# Patient Record
Sex: MALE | Race: WHITE | Employment: FULL TIME | URBAN - NONMETROPOLITAN AREA
[De-identification: names, ages, dates, MRNs, and addresses within clinical notes are randomized per-mention and may not be internally consistent; named-entity substitution may affect disease eponyms.]

---

## 2021-03-29 ENCOUNTER — APPOINTMENT (OUTPATIENT)
Dept: OCCUPATIONAL MEDICINE | Facility: OTHER | Age: 61
End: 2021-03-29

## 2021-03-29 PROCEDURE — 92552 PURE TONE AUDIOMETRY AIR: CPT

## 2021-03-29 PROCEDURE — 99000 SPECIMEN HANDLING OFFICE-LAB: CPT

## 2021-03-29 PROCEDURE — 99499 UNLISTED E&M SERVICE: CPT

## 2021-04-21 ENCOUNTER — HOSPITAL ENCOUNTER (EMERGENCY)
Facility: OTHER | Age: 61
Discharge: HOME | End: 2021-04-21
Attending: EMERGENCY MEDICINE | Admitting: EMERGENCY MEDICINE
Payer: COMMERCIAL

## 2021-04-21 ENCOUNTER — APPOINTMENT (OUTPATIENT)
Dept: GENERAL RADIOLOGY | Facility: OTHER | Age: 61
End: 2021-04-21
Attending: EMERGENCY MEDICINE
Payer: COMMERCIAL

## 2021-04-21 VITALS
SYSTOLIC BLOOD PRESSURE: 154 MMHG | DIASTOLIC BLOOD PRESSURE: 93 MMHG | HEART RATE: 67 BPM | OXYGEN SATURATION: 100 % | TEMPERATURE: 98.5 F | RESPIRATION RATE: 16 BRPM | WEIGHT: 179 LBS | BODY MASS INDEX: 23.72 KG/M2 | HEIGHT: 73 IN

## 2021-04-21 DIAGNOSIS — K59.00 CONSTIPATION, UNSPECIFIED CONSTIPATION TYPE: ICD-10-CM

## 2021-04-21 DIAGNOSIS — R10.84 ABDOMINAL PAIN, GENERALIZED: ICD-10-CM
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
## 2021-04-21 PROBLEM — M54.9 BACK PAIN: Status: ACTIVE | Noted: 2021-04-21

## 2021-04-21 PROBLEM — M54.12 CERVICAL RADICULOPATHY: Status: ACTIVE | Noted: 2019-12-17

## 2021-04-21 PROBLEM — G43.909 MIGRAINE HEADACHE: Status: ACTIVE | Noted: 2021-04-21

## 2021-04-21 PROBLEM — G46.4: Status: ACTIVE | Noted: 2021-04-21

## 2021-04-21 PROBLEM — G89.4 CHRONIC PAIN DISORDER: Status: ACTIVE | Noted: 2020-02-14

## 2021-04-21 PROBLEM — G47.00 INSOMNIA: Status: ACTIVE | Noted: 2020-02-14

## 2021-04-21 PROBLEM — E66.3 OVERWEIGHT: Status: ACTIVE | Noted: 2020-04-09

## 2021-04-21 PROBLEM — E29.1 HYPOGONADISM MALE: Status: ACTIVE | Noted: 2018-08-27

## 2021-04-21 PROBLEM — K58.9 IRRITABLE BOWEL SYNDROME: Status: ACTIVE | Noted: 2021-04-21

## 2021-04-21 PROBLEM — M50.90 CERVICAL DISC DISORDER: Status: ACTIVE | Noted: 2020-02-14

## 2021-04-21 LAB
ALBUMIN SERPL-MCNC: 4 G/DL (ref 3.5–5.7)
ALP SERPL-CCNC: 62 U/L (ref 34–104)
ALT SERPL W P-5'-P-CCNC: 17 U/L (ref 7–52)
ANION GAP SERPL CALCULATED.3IONS-SCNC: 8 MMOL/L (ref 3–14)
AST SERPL W P-5'-P-CCNC: 16 U/L (ref 13–39)
BASOPHILS # BLD AUTO: 0 10E9/L (ref 0–0.2)
BASOPHILS NFR BLD AUTO: 0.8 %
BILIRUB SERPL-MCNC: 1 MG/DL (ref 0.3–1)
BUN SERPL-MCNC: 11 MG/DL (ref 7–25)
CALCIUM SERPL-MCNC: 9.2 MG/DL (ref 8.6–10.3)
CHLORIDE SERPL-SCNC: 98 MMOL/L (ref 98–107)
CO2 SERPL-SCNC: 25 MMOL/L (ref 21–31)
CREAT SERPL-MCNC: 1.07 MG/DL (ref 0.7–1.3)
DIFFERENTIAL METHOD BLD: ABNORMAL
EOSINOPHIL # BLD AUTO: 0.2 10E9/L (ref 0–0.7)
EOSINOPHIL NFR BLD AUTO: 3.8 %
ERYTHROCYTE [DISTWIDTH] IN BLOOD BY AUTOMATED COUNT: 12 % (ref 10–15)
GFR SERPL CREATININE-BSD FRML MDRD: 70 ML/MIN/{1.73_M2}
GLUCOSE SERPL-MCNC: 101 MG/DL (ref 70–105)
HCT VFR BLD AUTO: 40.5 % (ref 40–53)
HGB BLD-MCNC: 14.9 G/DL (ref 13.3–17.7)
IMM GRANULOCYTES # BLD: 0 10E9/L (ref 0–0.4)
IMM GRANULOCYTES NFR BLD: 0.6 %
LYMPHOCYTES # BLD AUTO: 1.4 10E9/L (ref 0.8–5.3)
LYMPHOCYTES NFR BLD AUTO: 27.5 %
MCH RBC QN AUTO: 32.9 PG (ref 26.5–33)
MCHC RBC AUTO-ENTMCNC: 36.8 G/DL (ref 31.5–36.5)
MCV RBC AUTO: 89 FL (ref 78–100)
MONOCYTES # BLD AUTO: 0.5 10E9/L (ref 0–1.3)
MONOCYTES NFR BLD AUTO: 9.8 %
NEUTROPHILS # BLD AUTO: 2.9 10E9/L (ref 1.6–8.3)
NEUTROPHILS NFR BLD AUTO: 57.5 %
PLATELET # BLD AUTO: 200 10E9/L (ref 150–450)
POTASSIUM SERPL-SCNC: 4 MMOL/L (ref 3.5–5.1)
PROT SERPL-MCNC: 6.1 G/DL (ref 6.4–8.9)
RBC # BLD AUTO: 4.53 10E12/L (ref 4.4–5.9)
SODIUM SERPL-SCNC: 131 MMOL/L (ref 134–144)
WBC # BLD AUTO: 5 10E9/L (ref 4–11)

## 2021-04-21 PROCEDURE — 250N000013 HC RX MED GY IP 250 OP 250 PS 637: Performed by: EMERGENCY MEDICINE

## 2021-04-21 PROCEDURE — 96361 HYDRATE IV INFUSION ADD-ON: CPT | Performed by: EMERGENCY MEDICINE

## 2021-04-21 PROCEDURE — 93010 ELECTROCARDIOGRAM REPORT: CPT | Performed by: INTERNAL MEDICINE

## 2021-04-21 PROCEDURE — 85025 COMPLETE CBC W/AUTO DIFF WBC: CPT | Performed by: EMERGENCY MEDICINE

## 2021-04-21 PROCEDURE — 93005 ELECTROCARDIOGRAM TRACING: CPT | Performed by: EMERGENCY MEDICINE

## 2021-04-21 PROCEDURE — 99285 EMERGENCY DEPT VISIT HI MDM: CPT | Mod: 25 | Performed by: EMERGENCY MEDICINE

## 2021-04-21 PROCEDURE — 250N000011 HC RX IP 250 OP 636: Performed by: EMERGENCY MEDICINE

## 2021-04-21 PROCEDURE — 74018 RADEX ABDOMEN 1 VIEW: CPT

## 2021-04-21 PROCEDURE — 96374 THER/PROPH/DIAG INJ IV PUSH: CPT | Performed by: EMERGENCY MEDICINE

## 2021-04-21 PROCEDURE — 99282 EMERGENCY DEPT VISIT SF MDM: CPT | Performed by: EMERGENCY MEDICINE

## 2021-04-21 PROCEDURE — 80053 COMPREHEN METABOLIC PANEL: CPT | Performed by: EMERGENCY MEDICINE

## 2021-04-21 PROCEDURE — 258N000003 HC RX IP 258 OP 636: Performed by: EMERGENCY MEDICINE

## 2021-04-21 RX ORDER — OXYBUTYNIN CHLORIDE 5 MG/1
10 TABLET ORAL DAILY
COMMUNITY

## 2021-04-21 RX ORDER — NIFEDIPINE 30 MG/1
30 TABLET, EXTENDED RELEASE ORAL DAILY
COMMUNITY

## 2021-04-21 RX ORDER — METOPROLOL TARTRATE 25 MG/1
50 TABLET, FILM COATED ORAL DAILY
COMMUNITY

## 2021-04-21 RX ORDER — HYDROCODONE BITARTRATE AND ACETAMINOPHEN 10; 325 MG/1; MG/1
1 TABLET ORAL PRN
COMMUNITY

## 2021-04-21 RX ORDER — MAGNESIUM CARB/ALUMINUM HYDROX 105-160MG
296 TABLET,CHEWABLE ORAL ONCE
Status: COMPLETED | OUTPATIENT
Start: 2021-04-21 | End: 2021-04-21

## 2021-04-21 RX ORDER — ONDANSETRON 2 MG/ML
4 INJECTION INTRAMUSCULAR; INTRAVENOUS ONCE
Status: COMPLETED | OUTPATIENT
Start: 2021-04-21 | End: 2021-04-21

## 2021-04-21 RX ADMIN — SODIUM CHLORIDE 1000 ML: 9 INJECTION, SOLUTION INTRAVENOUS at 09:47

## 2021-04-21 RX ADMIN — ONDANSETRON 4 MG: 2 INJECTION INTRAMUSCULAR; INTRAVENOUS at 09:33

## 2021-04-21 RX ADMIN — MAGNESIUM CITRATE 296 ML: 1.75 LIQUID ORAL at 11:06

## 2021-04-21 ASSESSMENT — ENCOUNTER SYMPTOMS
LIGHT-HEADEDNESS: 0
DYSURIA: 0
SHORTNESS OF BREATH: 0
AGITATION: 0
FEVER: 0
ARTHRALGIAS: 0
CHEST TIGHTNESS: 0
CHILLS: 0
CONSTIPATION: 1
ABDOMINAL PAIN: 1
NAUSEA: 1
VOMITING: 1

## 2021-04-21 ASSESSMENT — MIFFLIN-ST. JEOR: SCORE: 1665.82

## 2021-04-21 NOTE — ED PROVIDER NOTES
History     Chief Complaint   Patient presents with     Abdominal Pain     Nausea     HPI  Benji Vo is a 60 year old male who is here with abdominal pain nausea vomiting and constipation.  Patient is up here for work.  He is living in a house that his company is renting.  He says that the water there is horrible.  He says that his symptoms began just within a day or 2 of moving in there and drinking this water.  He has not been drinking it for the last week but his symptoms have not improved.  He complains of abdominal pain and a sense of fullness.  He has been vomiting but not every day.  He does have chronic problems with constipation and it might be a little bit worse right now.  He says that he feels like there is a big ball in his stomach.  Also occasionally gets chest pain which he attributes to heartburn.  He has not noticed anything that specifically bring it on.  Usually associated with his nausea.  No fevers or chills.  No change in urination.  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     Priority: Medium     Snake bite 04/21/2021     Priority: Medium     Overweight 04/09/2020     Priority: Medium     Cervical disc disorder 02/14/2020     Priority: Medium     Chronic pain disorder 02/14/2020     Priority: Medium     Insomnia 02/14/2020     Priority: Medium     Cervical radiculopathy 12/17/2019     Priority: Medium     Hypogonadism male 08/27/2018     Priority: Medium     Encounter for long-term (current) use of insulin (H) 11/14/2016      "Priority: Medium     Psoriasis with arthropathy (H) 10/31/2016     Priority: Medium     Arthritis 08/05/2016     Priority: Medium     Carpal tunnel syndrome 06/17/2016     Priority: Medium     Supraventricular tachycardia (H) 04/22/2016     Priority: Medium        Past Medical History:    No past medical history on file.    Past Surgical History:    No past surgical history on file.    Family History:    No family history on file.    Social History:  Marital Status:    Social History     Tobacco Use     Smoking status: Not on file   Substance Use Topics     Alcohol use: Not on file     Drug use: Not on file        Medications:    ASPIRIN 81 PO  HYDROcodone-acetaminophen (NORCO)  MG per tablet  metoprolol tartrate (LOPRESSOR) 25 MG tablet  NIFEdipine ER OSMOTIC (PROCARDIA XL) 30 MG 24 hr tablet  oxybutynin (DITROPAN) 5 MG tablet  ustekinumab (STELARA) 45 MG/0.5ML SOLN          Review of Systems   Constitutional: Negative for chills and fever.   HENT: Negative for congestion.    Eyes: Negative for visual disturbance.   Respiratory: Negative for chest tightness and shortness of breath.    Cardiovascular: Negative for chest pain.   Gastrointestinal: Positive for abdominal pain, constipation, nausea and vomiting.   Genitourinary: Negative for dysuria.   Musculoskeletal: Negative for arthralgias.   Skin: Negative for rash.   Neurological: Negative for light-headedness.   Psychiatric/Behavioral: Negative for agitation.       Physical Exam   BP: 130/89  Pulse: 82  Temp: 97.9  F (36.6  C)  Resp: 16  Height: 185.4 cm (6' 1\")  Weight: 81.2 kg (179 lb)  SpO2: 100 %      Physical Exam  Vitals signs and nursing note reviewed.   Constitutional:       Appearance: He is well-developed.   HENT:      Head: Normocephalic and atraumatic.      Mouth/Throat:      Mouth: Mucous membranes are moist.   Eyes:      Conjunctiva/sclera: Conjunctivae normal.   Cardiovascular:      Rate and Rhythm: Normal rate and regular rhythm.      Heart " sounds: Normal heart sounds.   Pulmonary:      Effort: Pulmonary effort is normal.      Breath sounds: Normal breath sounds.   Abdominal:      General: Abdomen is flat. Bowel sounds are normal. There is no distension.      Palpations: Abdomen is soft.      Tenderness: There is no abdominal tenderness.   Skin:     General: Skin is warm and dry.   Neurological:      Mental Status: He is alert and oriented to person, place, and time.   Psychiatric:         Behavior: Behavior normal.         ED Course        Procedures                 Results for orders placed or performed during the hospital encounter of 04/21/21 (from the past 24 hour(s))   CBC with platelets differential   Result Value Ref Range    WBC 5.0 4.0 - 11.0 10e9/L    RBC Count 4.53 4.4 - 5.9 10e12/L    Hemoglobin 14.9 13.3 - 17.7 g/dL    Hematocrit 40.5 40.0 - 53.0 %    MCV 89 78 - 100 fl    MCH 32.9 26.5 - 33.0 pg    MCHC 36.8 (H) 31.5 - 36.5 g/dL    RDW 12.0 10.0 - 15.0 %    Platelet Count 200 150 - 450 10e9/L    Diff Method Automated Method     % Neutrophils 57.5 %    % Lymphocytes 27.5 %    % Monocytes 9.8 %    % Eosinophils 3.8 %    % Basophils 0.8 %    % Immature Granulocytes 0.6 %    Absolute Neutrophil 2.9 1.6 - 8.3 10e9/L    Absolute Lymphocytes 1.4 0.8 - 5.3 10e9/L    Absolute Monocytes 0.5 0.0 - 1.3 10e9/L    Absolute Eosinophils 0.2 0.0 - 0.7 10e9/L    Absolute Basophils 0.0 0.0 - 0.2 10e9/L    Abs Immature Granulocytes 0.0 0 - 0.4 10e9/L   Comprehensive metabolic panel   Result Value Ref Range    Sodium 131 (L) 134 - 144 mmol/L    Potassium 4.0 3.5 - 5.1 mmol/L    Chloride 98 98 - 107 mmol/L    Carbon Dioxide 25 21 - 31 mmol/L    Anion Gap 8 3 - 14 mmol/L    Glucose 101 70 - 105 mg/dL    Urea Nitrogen 11 7 - 25 mg/dL    Creatinine 1.07 0.70 - 1.30 mg/dL    GFR Estimate 70 >60 mL/min/[1.73_m2]    GFR Estimate If Black 85 >60 mL/min/[1.73_m2]    Calcium 9.2 8.6 - 10.3 mg/dL    Bilirubin Total 1.0 0.3 - 1.0 mg/dL    Albumin 4.0 3.5 - 5.7 g/dL     Protein Total 6.1 (L) 6.4 - 8.9 g/dL    Alkaline Phosphatase 62 34 - 104 U/L    ALT 17 7 - 52 U/L    AST 16 13 - 39 U/L   XR Abdomen 1 View    Narrative    XR ABDOMEN 1 VIEW    HISTORY: 60 years Male constipation    COMPARISON: None    TECHNIQUE: A single view abdomen was performed.   FINDINGS: There is a moderate to large volume of stool the colon. No  abnormally distended loops of bowel are otherwise seen        Impression    IMPRESSION: Moderate to large volume of stool. No evidence of bowel  obstruction.    ELISA LÓPEZ MD       Medications   magnesium citrate solution 296 mL (has no administration in time range)   ondansetron (ZOFRAN) injection 4 mg (4 mg Intravenous Given 4/21/21 7219)   0.9% sodium chloride BOLUS (1,000 mLs Intravenous New Bag 4/21/21 0894)       Assessments & Plan (with Medical Decision Making)     I have reviewed the nursing notes.    I have reviewed the findings, diagnosis, plan and need for follow up with the patient.  Labs are reassuring. Abdominal x-ray does show large volume of stool as above. I believe that is his main problem here. We discussed options and he would like to try a bottle of magnesium citrate rather than an enema. He will be discharged home at this time, once he gets himself clear.I recommend starting up his senna on a daily basis. He can always return here if worse.    New Prescriptions    No medications on file       Final diagnoses:   Abdominal pain, generalized   Constipation, unspecified constipation type       4/21/2021   [facility]     Sravan Gold MD  04/21/21 8225

## 2021-04-21 NOTE — DISCHARGE INSTRUCTIONS
You should try magnesium citrate. You could take a second bottle that after a few hours if this does not work, but I would not do more than 2 bottles. Once you get cleared out, you can start taking your senna again on a daily basis. Return if worse.  
yes

## 2021-04-21 NOTE — ED NOTES
"Nausea is unchanged since zofran was administered.  Patient continues to rate his \"discomfort\" at 7 in his ABD.  Yolande Au RN on 4/21/2021 at 10:47 AM    "

## 2021-04-21 NOTE — ED TRIAGE NOTES
"ED Nursing Triage Note (General)   ________________________________    Benji Issa Vo is a 60 year old Male that presents to triage private car  With history of  ABD pain with N/V reported by patient   Patient is here for work.  He is staying at the [employer] guest house.  He feels that he is having a reaction to the water.  IS c/o ABD pain, n/v.  Pain started a few days after March 29th and is gradually becoming worse. He states that at times, he vomits up foam.     /89   Pulse 82   Temp 97.9  F (36.6  C) (Tympanic)   Resp 16   Ht 1.854 m (6' 1\")   Wt 81.2 kg (179 lb)   SpO2 100%   BMI 23.62 kg/m  t  Patient appears alert  and oriented, in mild distress., and cooperative and calm behavior.    GCS Eye Opening = 4=Spontaneous  Airway: intact  Breathing noted as Normal.  Circulation Normal  Skin normal  Action taken:  Triage to critical care immediately      PRE HOSPITAL PRIOR LIVING SITUATION Alone  "

## 2021-04-22 LAB — INTERPRETATION ECG - MUSE: NORMAL

## 2021-05-27 ENCOUNTER — OFFICE VISIT (OUTPATIENT)
Dept: FAMILY MEDICINE | Facility: OTHER | Age: 61
End: 2021-05-27
Attending: NURSE PRACTITIONER
Payer: COMMERCIAL

## 2021-05-27 VITALS
HEIGHT: 73 IN | BODY MASS INDEX: 23.72 KG/M2 | RESPIRATION RATE: 16 BRPM | SYSTOLIC BLOOD PRESSURE: 132 MMHG | DIASTOLIC BLOOD PRESSURE: 76 MMHG | TEMPERATURE: 98.2 F | HEART RATE: 75 BPM | WEIGHT: 179 LBS | OXYGEN SATURATION: 97 %

## 2021-05-27 DIAGNOSIS — J34.89 SINUS PRESSURE: Primary | ICD-10-CM

## 2021-05-27 PROCEDURE — 99212 OFFICE O/P EST SF 10 MIN: CPT | Performed by: NURSE PRACTITIONER

## 2021-05-27 RX ORDER — SULFAMETHOXAZOLE/TRIMETHOPRIM 800-160 MG
TABLET ORAL
COMMUNITY
Start: 2021-05-20

## 2021-05-27 ASSESSMENT — PAIN SCALES - GENERAL: PAINLEVEL: MILD PAIN (3)

## 2021-05-27 ASSESSMENT — MIFFLIN-ST. JEOR: SCORE: 1665.82

## 2021-05-27 NOTE — NURSING NOTE
"Chief Complaint   Patient presents with     Sinus Problem     Patient is here for sinus drainage and pressure, cough and a foul taste in his mouth that started 3 days ago.     Initial /76   Pulse 75   Temp 98.2  F (36.8  C) (Tympanic)   Resp 16   Ht 1.854 m (6' 1\")   Wt 81.2 kg (179 lb)   SpO2 97%   BMI 23.62 kg/m   Estimated body mass index is 23.62 kg/m  as calculated from the following:    Height as of this encounter: 1.854 m (6' 1\").    Weight as of this encounter: 81.2 kg (179 lb).  Medication Reconciliation: complete    Yokasta Conte LPN  "

## 2021-05-27 NOTE — PATIENT INSTRUCTIONS
Try OTC medication and symptomatic treatment.     Patient Education     * Sinusitis (No Antibiotics)    The sinuses are air-filled spaces within the bones of the face. They connect to the inside of the nose. Sinusitis is an inflammation of the tissue that lines the sinuses. Sinusitis can occur during a cold. It can also happen due to allergies to pollens and other particles in the air. It can cause symptoms such as sinus congestion, headache, sore throat, facial swelling, and a feeling of fullness. It may also cause a low-grade fever. Your sinusitis does not include an infection with bacteria. Because of this, antibiotics are not used to treat this problem.  Home care    Drink plenty of water, hot tea, and other liquids. This may help thin nasal mucus. It also may help your sinuses drain fluids.    Heat may help soothe painful areas of your face. Use a towel soaked in hot water. Or,  the shower and direct the warm spray onto your face. Using a vaporizer along with a menthol rub at night may also help soothe symptoms.     An expectorant with guaifenesin may help thin nasal mucus and help your sinuses drain fluids.    You can use an over-the-counter decongestant, unless a similar medicine was prescribed to you. Nasal sprays work the fastest. Use one that contains phenylephrine or oxymetazoline. First blow your nose gently. Then use the spray. Do not use these medicines more often than directed on the label. If you do, your symptoms may get worse. You may also take pills that contain pseudoephedrine. Don t use products that combine multiple medicines. This is because side effects may be increased. Read all medicine labels. You can also ask the pharmacist for help. (People with high blood pressure should not use decongestants. They can raise blood pressure.)    Over-the-counter antihistamines may help if allergies contributed to your sinusitis.      Use acetaminophen or ibuprofen to control pain, unless another  pain medicine was prescribed to you. If you have chronic liver or kidney disease or ever had a stomach ulcer, talk with your healthcare provider before using these medicines. (Aspirin should never be taken by anyone under age 18 who is ill with a fever. It may cause severe liver damage.)    Use nasal rinses or irrigation as instructed by your healthcare provider.    Don't smoke. This can make symptoms worse.  Follow-up care  Follow up with your healthcare provider or our staff if you are NOT better in 1 week.  When to seek medical advice  Call your healthcare provider if any of these occur:    Green or yellow fluid draining from your nose or into your throat    Facial pain or headache that gets worse    Stiff neck    Unusual drowsiness or confusion    Swelling of your forehead or eyelids    Vision problems, such as blurred or double vision    Fever of 100.4 F (38 C) or higher, or as directed by your healthcare provider    Seizure    Breathing problems    Symptoms that don't go away in 10 days  For informational purposes only. Not to replace the advice of your health care provider.  Copyright   2018 [facility]. All rights reserved.

## 2021-05-27 NOTE — PROGRESS NOTES
ASSESSMENT/PLAN:  1. Sinus pressure    Discussed with the patient that an antibiotic would not be indicated at this time due to his symptoms starting 3 days prior.  Instructed the patient to try symptomatic treatment and continue using an antihistamine, Mucinex saline nasal spray and/or Dianna pot.  Instructed the patient to follow-up in 10 to 14 days if his symptoms persist or begin to worsen.  Patient expressed understanding.     Discussed with it would be symptoms are most likely related to viral sinusitis, seasonal allergies or viral upper respiratory infection.  Encouraged the patient to have Covid testing completed, patient declined at this time due to history of sinusitis and having his Covid vaccine.  Instructed the patient attempt to  Remain at home and away from other's until his symptoms begin to resolve.     Encouraged fluids    May use over-the-counter Tylenol or ibuprofen PRN    Discussed warning signs/symptoms indicative of need to f/u    Follow up if symptoms persist or worsen or concerns      I explained my diagnostic considerations and recommendations to the patient, who voiced understanding and agreement with the treatment plan. All questions were answered. We discussed potential side effects of any prescribed or recommended therapies, as well as expectations for response to treatments.    Disclaimer:  This note consists of words and symbols derived from keyboarding, dictation, or using voice recognition software. As a result, there may be errors in the script that have gone undetected. Please consider this when interpreting information found in this note.    HPI:    Benji Vo is a 60 year old male  who presents to [facility] today for sinus pressure of his left maxillary sinus.  He reports that he has had surgeries to his ethmoid sinus in the past and had a polyp removed 2 years ago. Nasal drainage seems to get worse in the afternoon.  Reports that the drainage is fowl tasting and  "green in appearance. He is also having a cough, intermittently coughing up mucous. Denies fevers.  Patient reports intermittent shortness of breath with exertion.  Patient is a former smoker.  Symptoms started about 3 days ago. He reports taking allergy medication this morning. No known COVID contact the patient reports that he is fully vaccinated.     History reviewed. No pertinent past medical history.  History reviewed. No pertinent surgical history.  Social History     Tobacco Use     Smoking status: Former Smoker     Smokeless tobacco: Never Used   Substance Use Topics     Alcohol use: Yes     Comment: occ     Current Outpatient Medications   Medication Sig Dispense Refill     ASPIRIN 81 PO 81 mg by Oral or J tube route daily       HYDROcodone-acetaminophen (NORCO)  MG per tablet Take 1 tablet by mouth as needed       metoprolol tartrate (LOPRESSOR) 25 MG tablet Take 50 mg by mouth daily       NIFEdipine ER OSMOTIC (PROCARDIA XL) 30 MG 24 hr tablet Take 30 mg by mouth daily       oxybutynin (DITROPAN) 5 MG tablet Take 10 mg by mouth daily       sulfamethoxazole-trimethoprim (BACTRIM DS) 800-160 MG tablet        ustekinumab (STELARA) 45 MG/0.5ML SOLN Inject 45 mg Subcutaneous every 3 months       Allergies   Allergen Reactions     Contrast Dye GI Disturbance     Crab Extract Allergy Skin Test GI Disturbance     Iodine      Other reaction(s): GI intolerance     Shellfish Allergy GI Disturbance, Other (See Comments) and Nausea     Shrimp      Other reaction(s): GI intolerance     Duloxetine Other (See Comments)     Cognitive changes  Other reaction(s): Other (see comments)  Cognitive changes  Cognitive changes         Past medical history, past surgical history, current medications and allergies reviewed and accurate to the best of my knowledge.        ROS:  Refer to HPI    /76   Pulse 75   Temp 98.2  F (36.8  C) (Tympanic)   Resp 16   Ht 1.854 m (6' 1\")   Wt 81.2 kg (179 lb)   SpO2 97%   BMI " 23.62 kg/m      EXAM:  General Appearance: Well appearing male, appropriate appearance for age. No acute distress  Ears: Left TM intact, no erythema, effusion is present, bulging present with clear fluid present behind TM, no purulence.  Right TM intact, no erythema, effusion is present, bulging present clear fluid behind TM, no purulence.  Left auditory canal clear.  Right auditory canal clear.  Normal external ears, non tender.  Orophayrnx: moist mucous membranes, posterior pharynx without erythema, tonsils without hypertrophy, no erythema, no exudates or petechiae, post nasal drip is noted, no trismus, voice clear.    Sinuses:  Sinus tenderness upon palpation of the bilateral maxillary sinuses, patient reports the most discomfort over the left maxillary sinus.   Nose:  Bilateral nares: no erythema, no edema, no drainage or congestion   Neck: supple without adenopathy  Respiratory: normal chest wall and respirations.  Normal effort.  Clear to auscultation bilaterally, no wheezing, crackles or rhonchi.  No increased work of breathing.  No cough appreciated.  Cardiac: RRR with no murmurs  Psychological: normal affect, alert, oriented, and pleasant.

## (undated) RX ORDER — SODIUM CHLORIDE 9 MG/ML
INJECTION, SOLUTION INTRAVENOUS
Status: DISPENSED
Start: 2021-04-21

## (undated) RX ORDER — MAGNESIUM CARB/ALUMINUM HYDROX 105-160MG
TABLET,CHEWABLE ORAL
Status: DISPENSED
Start: 2021-04-21

## (undated) RX ORDER — ONDANSETRON 2 MG/ML
INJECTION INTRAMUSCULAR; INTRAVENOUS
Status: DISPENSED
Start: 2021-04-21